# Patient Record
Sex: FEMALE | Race: WHITE | URBAN - METROPOLITAN AREA
[De-identification: names, ages, dates, MRNs, and addresses within clinical notes are randomized per-mention and may not be internally consistent; named-entity substitution may affect disease eponyms.]

---

## 2017-02-10 ENCOUNTER — OFFICE VISIT (OUTPATIENT)
Dept: OBGYN | Facility: CLINIC | Age: 22
End: 2017-02-10
Attending: OBSTETRICS & GYNECOLOGY
Payer: COMMERCIAL

## 2017-02-10 VITALS
DIASTOLIC BLOOD PRESSURE: 67 MMHG | HEIGHT: 67 IN | WEIGHT: 138.4 LBS | BODY MASS INDEX: 21.72 KG/M2 | SYSTOLIC BLOOD PRESSURE: 122 MMHG | HEART RATE: 75 BPM

## 2017-02-10 DIAGNOSIS — Z00.00 ROUTINE GENERAL MEDICAL EXAMINATION AT A HEALTH CARE FACILITY: Primary | ICD-10-CM

## 2017-02-10 DIAGNOSIS — Z30.019 ENCOUNTER FOR INITIAL PRESCRIPTION OF CONTRACEPTIVES: ICD-10-CM

## 2017-02-10 PROBLEM — F41.9 ANXIETY: Status: ACTIVE | Noted: 2017-02-10

## 2017-02-10 PROCEDURE — 99212 OFFICE O/P EST SF 10 MIN: CPT | Mod: 25,ZF

## 2017-02-10 RX ORDER — NORGESTIMATE AND ETHINYL ESTRADIOL 7DAYSX3 LO
1 KIT ORAL DAILY
Qty: 28 TABLET | Refills: 3 | Status: SHIPPED
Start: 2017-02-10 | End: 2017-05-26

## 2017-02-10 ASSESSMENT — ANXIETY QUESTIONNAIRES
5. BEING SO RESTLESS THAT IT IS HARD TO SIT STILL: NOT AT ALL
GAD7 TOTAL SCORE: 2
6. BECOMING EASILY ANNOYED OR IRRITABLE: NOT AT ALL
1. FEELING NERVOUS, ANXIOUS, OR ON EDGE: SEVERAL DAYS
7. FEELING AFRAID AS IF SOMETHING AWFUL MIGHT HAPPEN: NOT AT ALL
2. NOT BEING ABLE TO STOP OR CONTROL WORRYING: NOT AT ALL
3. WORRYING TOO MUCH ABOUT DIFFERENT THINGS: NOT AT ALL

## 2017-02-10 ASSESSMENT — PATIENT HEALTH QUESTIONNAIRE - PHQ9: 5. POOR APPETITE OR OVEREATING: SEVERAL DAYS

## 2017-02-10 NOTE — LETTER
2/10/2017       RE: Nurys Gerardo  [address] [facility] [place] [address]     Dear Colleague,    Thank you for referring your patient, Nurys Gerardo, to the [facility] at [facility]. Please see a copy of my visit note below.    See, could you finish your incomplete note on this patient so i may close the encounter?    [facility]  Annual Exam    HPI:    Nurys Gerardo is a 22 year old , here for well woman exam and to discuss contraception.  She tried OCPs, unsure of what kind, last summer but reports she stopped after a couple of months after they changed her mood.  She otherwise denies any concerns. Of note, the patient states she received Gardasil vaccines x3.     GYN History  - LMP: Patient's last menstrual period was 2017 (exact date).  - Menses: Menarche at 13, cycles q28-30, lasting 6-7 days, with mild to moderate flow, moderate cramps  - Pap Smears: Never before  - Contraception: OCPs in past for a short time   - Sexual Activity: Fore play but denies oral or penetrative intercourse    - Hx STIs: Denies  - Hx UTIs: Denies     OBHx  Obstetric History       T0      TAB0   SAB0   E0   M0   L0           Past Medical History   Diagnosis Date     Anxiety      Sees therapist       Past Surgical History   Procedure Laterality Date     Dental surgery       Molar removal      Current Outpatient Prescriptions   Medication     norgestim-eth estrad triphasic (ORTHO TRI-CYCLEN LO) 0.18/0.215/0.25 MG-25 MCG per tablet     multivitamin, therapeutic with minerals (THERA-VIT-M) TABS     NAPROXEN PO     DiphenhydrAMINE HCl (BENADRYL PO)     No current facility-administered medications for this visit.         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, graduating in Spring 2018  - Has had a boyfriend for 1 year  - In the marching band;   "s[employer]    Family History   Problem Relation Age of Onset     Hypertension Father      Asthma Paternal Grandmother      Bladder Cancer Maternal Grandfather       ROS: 10-Point ROS negative except as noted in HPI    Preventative Health  Feelings of depression: Denies   Seat belt usage: Yes, daily basis  Diet: Meats, veggies, fruits   Exercise: Yes, regularly (run, play racquet ball)    Physical Exam  /67 mmHg  Pulse 75  Ht 1.702 m (5' 7\")  Wt 62.778 kg (138 lb 6.4 oz)  BMI 21.67 kg/m2  LMP 02/04/2017 (Exact Date)  Breastfeeding? No  Gen: Well-appearing, NAD  HEENT: Normocephalic, atraumatic  Neck: Thyroid is not enlarged, no appreciable masses palpated. Non-tender  CV:  RRR, no m/r/g auscultated  Pulm: CTAB, no w/r/r auscultated  Abd: Soft, non-tender, non-distended  Ext: No LE edema, extremities warm and well perfused    Breast: Symmetric, no nipple discharge or retraction, no axillary lymphadenopathy, no palpable nodules or masses bilaterally.    Pelvic:  Normal appearing external female genitalia. Normal hair distribution. After three attempts with medium and small Soliman speculums, the patient was unable to tolerate speculum exam to visualize cervix or obtain pap smear.  Uterus is small, mobile, non-tender. No adnexal tenderness or masses.     --Ideal BMI: 18.5-24.9  Current BMI: Body mass index is 21.67 kg/(m^2).  --Underweight = <18.5  --Normal weight = 18.5-24.9  --Overweight = 25-29.9  --Obesity = >30    Assessment/Plan  Nurys Gerardo is a 22 year old G0 female here for annual exam and contraception counseling.     1. Routine Health Maintenance:   - Unable to perform pap smear.  Recommend patient come back in 6 weeks for pap smear (using lidocaine gel prior)    2. Contraception counseling   - Patient elected for new OCP Rx today   - Patient information given   - Will review option at follow up (pap smear) appointment    Sulema Lzooya MD   OB/Gyn Resident, PGY-3  February 10, 2017, 3:24 PM "     Patient was seen by the resident in Continuity of Care Clinic.  I reviewed the history & exam.  The patient's assessment and plan were made jointly.    Chrissy Lane MD MPH  Sulema Galeana MD

## 2017-02-10 NOTE — PROGRESS NOTES
[facility]  Annual Exam    HPI:    Nurys Gerardo is a 22 year old , here for well woman exam and to discuss contraception.  She tried OCPs, unsure of what kind, last summer but reports she stopped after a couple of months after they changed her mood.  She otherwise denies any concerns. Of note, the patient states she received Gardasil vaccines x3.     GYN History  - LMP: Patient's last menstrual period was 2017 (exact date).  - Menses: Menarche at 13, cycles q28-30, lasting 6-7 days, with mild to moderate flow, moderate cramps  - Pap Smears: Never before  - Contraception: OCPs in past for a short time   - Sexual Activity: Fore play but denies oral or penetrative intercourse    - Hx STIs: Denies  - Hx UTIs: Denies     OBHx  Obstetric History       T0      TAB0   SAB0   E0   M0   L0           Past Medical History   Diagnosis Date     Anxiety      Sees therapist       Past Surgical History   Procedure Laterality Date     Dental surgery       Molar removal      Current Outpatient Prescriptions   Medication     norgestim-eth estrad triphasic (ORTHO TRI-CYCLEN LO) 0.18/0.215/0.25 MG-25 MCG per tablet     multivitamin, therapeutic with minerals (THERA-VIT-M) TABS     NAPROXEN PO     DiphenhydrAMINE HCl (BENADRYL PO)     No current facility-administered medications for this visit.         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[employer], graduating in Spring 2018  - Has had a boyfriend for 1 year  - In the marching band; tenor till[employer]    Family History   Problem Relation Age of Onset     Hypertension Father      Asthma Paternal Grandmother      Bladder Cancer Maternal Grandfather       ROS: 10-Point ROS negative except as noted in HPI    Preventative Health  Feelings of depression: Denies   Seat belt usage: Yes, daily basis  Diet: Meats, veggies, fruits   Exercise: Yes, regularly (run, play racquet  "ball)    Physical Exam  /67 mmHg  Pulse 75  Ht 1.702 m (5' 7\")  Wt 62.778 kg (138 lb 6.4 oz)  BMI 21.67 kg/m2  LMP 02/04/2017 (Exact Date)  Breastfeeding? No  Gen: Well-appearing, NAD  HEENT: Normocephalic, atraumatic  Neck: Thyroid is not enlarged, no appreciable masses palpated. Non-tender  CV:  RRR, no m/r/g auscultated  Pulm: CTAB, no w/r/r auscultated  Abd: Soft, non-tender, non-distended  Ext: No LE edema, extremities warm and well perfused    Breast: Symmetric, no nipple discharge or retraction, no axillary lymphadenopathy, no palpable nodules or masses bilaterally.    Pelvic:  Normal appearing external female genitalia. Normal hair distribution. After three attempts with medium and small Soliman speculums, the patient was unable to tolerate speculum exam to visualize cervix or obtain pap smear.  Uterus is small, mobile, non-tender. No adnexal tenderness or masses.       --Ideal BMI: 18.5-24.9  Current BMI: Body mass index is 21.67 kg/(m^2).  --Underweight = <18.5  --Normal weight = 18.5-24.9  --Overweight = 25-29.9  --Obesity = >30    Assessment/Plan  Nurys Gerardo is a 22 year old G0 female here for annual exam and contraception counseling.     1. Routine Health Maintenance:   - Unable to perform pap smear.  Recommend patient come back in 6 weeks for pap smear (using lidocaine gel prior)    2. Contraception counseling   - Patient elected for new OCP Rx today   - Patient information given   - Will review option at follow up (pap smear) appointment    Sulema Lozoya MD   OB/Gyn Resident, PGY-3  February 10, 2017, 3:24 PM     Patient was seen by the resident in Continuity of Care Clinic.  I reviewed the history & exam.  The patient's assessment and plan were made jointly.    Chrissy Lane MD MPH    "

## 2017-02-10 NOTE — MR AVS SNAPSHOT
After Visit Summary   2/10/2017    Nurys Gerardo    MRN: [id number]           Patient Information     Date Of Birth          1995        Visit Information        Provider Department      2/10/2017 3:00 PM Sulema Galeana MD [facility]        Today's Diagnoses     Routine general medical examination at a health care facility    -  1     Encounter for initial prescription of contraceptives           Care Instructions    Make a pap smear appointment in 6 weeks         Follow-ups after your visit        Follow-up notes from your care team     Return in about 6 weeks (around 3/24/2017).      Who to contact     Please call your clinic at [phone] to:    Ask questions about your health    Make or cancel appointments    Discuss your medicines    Learn about your test results    Speak to your doctor   If you have compliments or concerns about an experience at your clinic, or if you wish to file a complaint, please contact [facility] Patient Relations at [phone] or email us at [email or web address]         Additional Information About Your Visit        MyChart Information     Kwanji is an electronic gateway that provides easy, online access to your medical records. With Kwanji, you can request a clinic appointment, read your test results, renew a prescription or communicate with your care team.     To sign up for Kwanji visit the website at [email or web address]   You will be asked to enter the access code listed below, as well as some personal information. Please follow the directions to create your username and password.     Your access code is: [email or web address]  Expires: 2017  9:00 AM     Your access code will  in 90 days. If you need help or a new code, please contact your [facility] or call [phone] for assistance.        Care EveryWhere ID     This is your Care EveryWhere ID. This  "could be used by other organizations to access your [facility] medical records  [id number]        Your Vitals Were     Pulse Height BMI (Body Mass Index) Last Period Breastfeeding?       75 1.702 m (5' 7\") 21.67 kg/m2 02/04/2017 (Exact Date) No        Blood Pressure from Last 3 Encounters:   02/10/17 122/67   05/01/16 121/72    Weight from Last 3 Encounters:   02/10/17 62.778 kg (138 lb 6.4 oz)   05/01/16 63.504 kg (140 lb)              Today, you had the following     No orders found for display         Today's Medication Changes          These changes are accurate as of: 2/10/17  5:09 PM.  If you have any questions, ask your nurse or doctor.               Start taking these medicines.        Dose/Directions    norgestim-eth estrad triphasic 0.18/0.215/0.25 MG-25 MCG per tablet   Commonly known as:  ORTHO TRI-CYCLEN LO   Used for:  Encounter for initial prescription of contraceptives   Started by:  Sulema Galeana MD        Dose:  1 tablet   Take 1 tablet by mouth daily   Quantity:  28 tablet   Refills:  3            Where to get your medicines      These medications were sent to [facility]/pharmacy #[id number] - [place], [place] - [address]  [address] [address]     Phone:  [phone]    - norgestim-eth estrad triphasic 0.18/0.215/0.25 MG-25 MCG per tablet             Primary Care Provider Office Phone # Fax #    Jennifer FAZAL Villalba [phone]       [facility] [address] [address]        Thank you!     Thank you for choosing [facility]  for your care. Our goal is always to provide you with excellent care. Hearing back from our patients is one way we can continue to improve our services. Please take a few minutes to complete the written survey that you may receive in the mail after your visit with us. Thank you!             Your Updated Medication List - Protect others around you: Learn how to safely use, store and throw away " your medicines at www.disposemymeds.org.          This list is accurate as of: 2/10/17  5:09 PM.  Always use your most recent med list.                   Brand Name Dispense Instructions for use    BENADRYL PO          multivitamin, therapeutic with minerals Tabs tablet      Take 1 tablet by mouth daily       NAPROXEN PO      Take 500 mg by mouth       norgestim-eth estrad triphasic 0.18/0.215/0.25 MG-25 MCG per tablet    ORTHO TRI-CYCLEN LO    28 tablet    Take 1 tablet by mouth daily

## 2017-02-11 ASSESSMENT — PATIENT HEALTH QUESTIONNAIRE - PHQ9: SUM OF ALL RESPONSES TO PHQ QUESTIONS 1-9: 2

## 2017-02-11 ASSESSMENT — ANXIETY QUESTIONNAIRES: GAD7 TOTAL SCORE: 2

## 2017-05-26 ENCOUNTER — OFFICE VISIT (OUTPATIENT)
Dept: DERMATOLOGY | Facility: CLINIC | Age: 22
End: 2017-05-26

## 2017-05-26 DIAGNOSIS — D22.9 MULTIPLE NEVI: ICD-10-CM

## 2017-05-26 DIAGNOSIS — Z12.83 SKIN CANCER SCREENING: ICD-10-CM

## 2017-05-26 DIAGNOSIS — L70.0 ACNE VULGARIS: Primary | ICD-10-CM

## 2017-05-26 ASSESSMENT — PAIN SCALES - GENERAL: PAINLEVEL: NO PAIN (0)

## 2017-05-26 NOTE — NURSING NOTE
Dermatology Rooming Note    Nurys Gerardo's goals for this visit include:   Chief Complaint   Patient presents with     Derm Problem     Nurys is here today for a skin check. Has a concerning mole on her left breast that is getting bigger.       Catarina Acevedo LPN

## 2017-05-26 NOTE — LETTER
5/26/2017       RE: Nurys Gerardo  [address] [address]     Dear Colleague,    Thank you for referring your patient, Nurys Gerardo, to the [facility] at [facility]. Please see a copy of my visit note below.    [facility] Dermatology Note      Dermatology Problem List:  1.Acne vulgaris  -current treatment Neutrogena acne wash, followed by moisturizer  2. Multiple clinically benign nevi on the face, arms, chest, legs  -to follow back in 2-3 years or earlier for changing lesions    Encounter Date: May 26, 2017    CC:  Chief Complaint   Patient presents with     Derm Problem     Nurys is here today for a skin check. Has a concerning mole on her left breast that is getting bigger.         History of Present Illness:  Ms. Nurys Gerardo is a 22 year old female who presents in self referral for changing mole on left breast. She first noticed the mole appear about 1 year ago and it has changed is size, color without itching, bleeding. She had frequent sunburns as a child with peeling. She currently only uses SPF 50 when she knows she will be outside for extended periods of time. She has no family history of skin cancer or melanoma. She has a personal history of acne but no other skin conditions.    Past Medical History:   Patient Active Problem List   Diagnosis     Anxiety       Past Surgical History:   Procedure Laterality Date     DENTAL SURGERY      Molar removal       Social History:  The patient is a student. The patient admits to use of tanning beds.    Family History:  There is no family history of skin cancer. and There is no family history of melanoma or pancreatic cancer.    Medications:  Current Outpatient Prescriptions   Medication Sig Dispense Refill     cholecalciferol (VITAMIN D3) 1000 UNIT tablet Take 1,000 Units by mouth       Calcium Carb-Cholecalciferol 600-1000 MG-UNIT CAPS        multivitamin, therapeutic with  minerals (THERA-VIT-M) TABS Take 1 tablet by mouth daily       NAPROXEN PO Take 500 mg by mouth as needed        DiphenhydrAMINE HCl (BENADRYL PO) Take by mouth as needed        Allergies   Allergen Reactions     Amoxicillin Hives     Zithromax [Azithromycin] Hives       Review of Systems:  -Skin/Heme New Pt: The patient denies frequent sun exposure. The patient denies excessive scarring or problems healing except as per HPI. The patient denies excessive bleeding.  -Allergy/Immunology: No history of nickel or other skin allergy. No history of asthma or hay fever.  -GI: The patient denies nausea, vomiting, diarrhea or abdominal pain., Rheum: The patient denies arthralgia, joint stiffness, joint swelling or myalgias.  -Constitutional: The patient denies fatigue, fevers, chills, unintended weight loss, and night sweats.  -HEENT: Patient denies nonhealing oral sores.  -Skin: As above in HPI. No additional skin concerns.    Physical exam:  Vitals: There were no vitals taken for this visit.  GEN: This is a well developed, well-nourished female in no acute distress, in a pleasant mood.    SKIN: Total skin excluding the undergarment areas was performed. The exam included the head/face, neck, both arms, chest, back, abdomen, both legs, digits and/or nails.   -several tan-to-pigmented macules and papules over the face, arms, chest, legs consistent with nevi  -right outer axilla with cafe au lait patch  -Face with one 3-4 mm flesh toned soft domed shaped papule with protruding hair consistent with nevi  -She has one discrete dound < 2 mm dark brown macule on the left breast with globular nature under dermatoscope.   -one discrete square-shaped 3 x 2 mm dark brown macule on the back of the right lower leg consistent with nevi  -No other lesions of concern on areas examined.     Impression/Plan:  1. Multiple clinically benign nevi on the face, arms, chest, legs    ABCDs of melanoma were discussed and self skin checks were  advised.     Benign nature was discussed. No further intervention needed at this time.     discussed how she is at an age where her body will continue to produce new moles, and in the 1st few months will grow to their intended size and then stop. Discussed if she were to see moles doubling in size over several months, to call and return to clinic.     2. Acne vulgaris    Patient is happy with her current regimen.     Continue Neutrogena facial wash.       Follow-up in 2-3 years, earlier for new or changing lesions.     Staff Involved:  Scribed by Kate Bacon, MS4 for Dr. Anne.      Ms. Bacon acted as a scribe for me today and accurately reflected my words and actions.    I agree with above History, Review of Systems, Physical exam and Plan.  I have reviewed the content of the documentation and have edited it as needed. I have personally performed the services documented here and the documentation accurately represents those services and the decisions I have made.     Shey Anne MD  Dermatology Staff

## 2017-05-26 NOTE — PATIENT INSTRUCTIONS
Making New moles at your age is completely normal. We would expect for your body to make new moles up until your late 30's.   Like we discussed, you may take photo of your current moles and can review them from time to time to look for any changes. If there are ever any changing moles you are concerned about please call and come in for us to take a look.     Follow up with us in 2-3 years.     Sunscreens topical dosage forms  What are Sunscreens topical dosage forms?  SUNSCREENS protect your skin from the harmful effects of the sun and help to prevent sunburn. There are 2 different kinds of sunscreens called 'physical sunscreens' and 'chemical sunscreens.' Physical sunscreens reflect the sun's UV radiation. Chemical sunscreens absorb the sun's UV radiation. All physical sunscreens give UVA and UVB protection. All chemical sunscreens give UVB protection. Some chemical sunscreens give both UVA and UVB protection. Limiting the amount of time you spend in the sun and using sunscreens can help prevent wrinkles and skin damage, such as skin cancer.  What should my health care professional know before I receive Sunscreens?  They need to know if you have any of these conditions:    an unusual reaction to sunscreens, PABA, other medicines, foods, dyes, or preservatives    pregnant or trying to get pregnant    breast-feeding  How should this medicine be used?  The sun protection factor (SPF) found on the product label tells you how much protection a sunscreen offers. Products with high SPFs give more protection against the sun than products with low SPF. Choose a sunscreen product based on the type of activity in which you are involved, your age, site of application, your skin condition, and your skin type. Ask your pharmacist or health care professional about which sunscreen product is best for you.  Sunscreens are for external use only; apply only to the skin. Do not take by mouth. Apply evenly and liberally to all  exposed areas of the skin 30 minutes before any sun exposure. Reapply sunscreens every 1--2 hours and after swimming, excessive sweating, or towel drying. Follow the directions on the product label.  Sunscreens are not recommended for infants less than 6 months of age. Infants in this age group should be kept out of the sun. Children and infants who are 6 months of age and older should use sunscreens that contain an SPF of 15 or higher. Contact your pediatrician or health care professional regarding the use of this medicine in children.  Use topical insect repellants containing diethyltoluamide, DEET cautiously while using sunscreens. Sunscreens may increase the absorption of diethyltoluamide, DEET into the skin. This is especially important in children.  What if I miss a dose?  Apply it as soon as you remember.  What drug(s) may interact with Sunscreens?    Estrasorb  topical estrogen emulsion    insect repellants containing diethyltoluamide, DEET  Tell your prescriber or health care professional about all other medicines you are taking, including non-prescription medicines, nutritional supplements, or herbal products. Check with your health care professional before stopping or starting any of your medicines.  What should I watch for while taking Sunscreens?  Do not get sunscreen in your eyes. If you do, rinse out with plenty of cool water.  Minimize your exposure to the sun between the hours of 10 a.m. and 2 p.m. (11 a.m. and 3 p.m. daylight savings time). Be extra careful on cloudy or overcast days and around reflective surfaces such as concrete, sand, snow, or water. You should also wear protective clothing including a hat, long-sleeved shirt, and sunglasses. Avoid sunlamps and tanning beds.  Some sunscreens may discolor and stain light-colored fabrics yellow. Allow sunscreen to dry before covering the area to which the sunscreen was applied.  What side effects may I notice from receiving Sunscreens?  Side  effects that you should report to your prescriber or health care professional as soon as possible:    dark red spots on the skin    painful, red, pus-filled blisters in hair follicles  Side effects that usually do not require medical attention (report to your prescriber or health care professional if they continue or are bothersome):    acne    burning or itching of the skin    dry or irritated skin  If you experience skin irritation from a sunscreen you can try a different formulation to prevent the reaction from recurring.  Where can I keep my medicine?  Keep out of reach of children.  Store below 40 degrees C (104 degrees F), preferably at room temperature between 15--30 degrees C (59 and 86 degrees F), unless otherwise specified by the . Store away from heat and direct light. Replace sunscreens yearly to maintain their effectiveness. Discard after expiration date on the bottle.  NOTE:This sheet is a summary. It may not cover all possible information. If you have questions about this medicine, talk to your doctor, pharmacist, or health care provider. Copyright  2016 Gold Standard      Recognizing Skin Cancer  Doing monthly skin checkups is the best way to find new marks or skin changes. During your skin checkups, be sure to follow the ABCDEs of skin checks. This means checking moles or other growths for Asymmetry, Border, Color, Diameter, and Evolving (changing). Note, too, any new growths, or, if any of your growths bleed, itch, or are painful.  The ABCDEs of skin checks  Check your moles or growths for signs of melanoma using ABCDE:    Asymmetry: the sides of the mole or growth don t match    Border: the edges are ragged, notched, or blurred    Color: the color within the mole or growth varies    Diameter: the mole or growth is larger than 6 mm (size of a pencil eraser)    Evolving: the size, shape, or color of the mole or growth is changing (evolving is not shown below.)       In addition to the  ABCDEs, other warning signs of skin cancer include:    A spot or mole that looks different from all other marks on your skin    Changes in how an area feels, such as itching, tenderness, or pain    Changes in the skin's surface, such as oozing, bleeding, or scaliness    A sore that does not heal    New swelling or redness beyond the border of a mole  Who s at risk?  Anyone can get skin cancer. But you are at greater risk if you have:    Fair skin, light-colored hair, or light-colored eyes    Many moles or abnormal moles on your skin    A history of sunburns from sunlight or tanning beds    A family history of skin cancer    A history of exposure to radiation or chemicals    A weakened immune system  Also, a personal history of skin cancer puts you at risk for recurring skin cancer.  How to check your skin  Do your monthly skin checkups in front of a full-length mirror. Check all parts of your body, including your:    Head (ears, face, neck, and scalp)    Torso (front, back, and sides)    Arms (tops, undersides, upper, and lower armpits)    Hands (palms, backs, and fingers, including under the nails)    Buttocks and genitals    Legs (front, back, and sides)    Feet (tops, soles, toes, including under the nails, and between toes)  If you have a lot of moles, take digital photos of them each month. Make sure to take photos both up close and from a distance. These can help you see if any moles change over time.  When to seek medical treatment  Most skin changes are not cancer. But if you see any changes in your skin, call your doctor right away. Only he or she can diagnose a problem. If you have skin cancer, seeing your doctor can be the first step toward getting the treatment that could save your life.     4793-1799 [employer] [address] [place] [address], [place] [address]. All rights reserved. This information is not intended as a substitute for professional medical care. Always follow your healthcare  professional's instructions.

## 2017-05-26 NOTE — PROGRESS NOTES
[facility] Dermatology Note      Dermatology Problem List:  1.Acne vulgaris  -current treatment Neutrogena acne wash, followed by moisturizer  2. Multiple clinically benign nevi on the face, arms, chest, legs  -to follow back in 2-3 years or earlier for changing lesions    Encounter Date: May 26, 2017    CC:  Chief Complaint   Patient presents with     Derm Problem     Nurys is here today for a skin check. Has a concerning mole on her left breast that is getting bigger.         History of Present Illness:  Ms. Nurys Gerardo is a 22 year old female who presents in self referral for changing mole on left breast. She first noticed the mole appear about 1 year ago and it has changed is size, color without itching, bleeding. She had frequent sunburns as a child with peeling. She currently only uses SPF 50 when she knows she will be outside for extended periods of time. She has no family history of skin cancer or melanoma. She has a personal history of acne but no other skin conditions.    Past Medical History:   Patient Active Problem List   Diagnosis     Anxiety       Past Surgical History:   Procedure Laterality Date     DENTAL SURGERY      Molar removal       Social History:  The patient is a student. The patient admits to use of tanning beds.    Family History:  There is no family history of skin cancer. and There is no family history of melanoma or pancreatic cancer.    Medications:  Current Outpatient Prescriptions   Medication Sig Dispense Refill     cholecalciferol (VITAMIN D3) 1000 UNIT tablet Take 1,000 Units by mouth       Calcium Carb-Cholecalciferol 600-1000 MG-UNIT CAPS        multivitamin, therapeutic with minerals (THERA-VIT-M) TABS Take 1 tablet by mouth daily       NAPROXEN PO Take 500 mg by mouth as needed        DiphenhydrAMINE HCl (BENADRYL PO) Take by mouth as needed        Allergies   Allergen Reactions     Amoxicillin Hives     Zithromax [Azithromycin] Hives       Review of  Systems:  -Skin/Heme New Pt: The patient denies frequent sun exposure. The patient denies excessive scarring or problems healing except as per HPI. The patient denies excessive bleeding.  -Allergy/Immunology: No history of nickel or other skin allergy. No history of asthma or hay fever.  -GI: The patient denies nausea, vomiting, diarrhea or abdominal pain., Rheum: The patient denies arthralgia, joint stiffness, joint swelling or myalgias.  -Constitutional: The patient denies fatigue, fevers, chills, unintended weight loss, and night sweats.  -HEENT: Patient denies nonhealing oral sores.  -Skin: As above in HPI. No additional skin concerns.    Physical exam:  Vitals: There were no vitals taken for this visit.  GEN: This is a well developed, well-nourished female in no acute distress, in a pleasant mood.    SKIN: Total skin excluding the undergarment areas was performed. The exam included the head/face, neck, both arms, chest, back, abdomen, both legs, digits and/or nails.   -several tan-to-pigmented macules and papules over the face, arms, chest, legs consistent with nevi  -right outer axilla with cafe au lait patch  -Face with one 3-4 mm flesh toned soft domed shaped papule with protruding hair consistent with nevi  -She has one discrete dound < 2 mm dark brown macule on the left breast with globular nature under dermatoscope.   -one discrete square-shaped 3 x 2 mm dark brown macule on the back of the right lower leg consistent with nevi  -No other lesions of concern on areas examined.     Impression/Plan:  1. Multiple clinically benign nevi on the face, arms, chest, legs    ABCDs of melanoma were discussed and self skin checks were advised.     Benign nature was discussed. No further intervention needed at this time.     discussed how she is at an age where her body will continue to produce new moles, and in the 1st few months will grow to their intended size and then stop. Discussed if she were to see moles  doubling in size over several months, to call and return to clinic.     2. Acne vulgaris    Patient is happy with her current regimen.     Continue Neutrogena facial wash.       Follow-up in 2-3 years, earlier for new or changing lesions.     Staff Involved:  Scribed by Kate Bacon, MS4 for Dr. Anne.      Ms. Arleen acted as a scribe for me today and accurately reflected my words and actions.    I agree with above History, Review of Systems, Physical exam and Plan.  I have reviewed the content of the documentation and have edited it as needed. I have personally performed the services documented here and the documentation accurately represents those services and the decisions I have made.     Shey Anne MD  Dermatology Staff

## 2017-05-26 NOTE — MR AVS SNAPSHOT
After Visit Summary   5/26/2017    Nurys Gerardo    MRN: [id number]           Patient Information     Date Of Birth          1995        Visit Information        Provider Department      5/26/2017 10:15 AM Shey Anne MD [facility] Dermatology        Care Instructions      Making New moles at your age is completely normal. We would expect for your body to make new moles up until your late 30's.   Like we discussed, you may take photo of your current moles and can review them from time to time to look for any changes. If there are ever any changing moles you are concerned about please call and come in for us to take a look.     Follow up with us in 2-3 years.     Sunscreens topical dosage forms  What are Sunscreens topical dosage forms?  SUNSCREENS protect your skin from the harmful effects of the sun and help to prevent sunburn. There are 2 different kinds of sunscreens called 'physical sunscreens' and 'chemical sunscreens.' Physical sunscreens reflect the sun's UV radiation. Chemical sunscreens absorb the sun's UV radiation. All physical sunscreens give UVA and UVB protection. All chemical sunscreens give UVB protection. Some chemical sunscreens give both UVA and UVB protection. Limiting the amount of time you spend in the sun and using sunscreens can help prevent wrinkles and skin damage, such as skin cancer.  What should my health care professional know before I receive Sunscreens?  They need to know if you have any of these conditions:    an unusual reaction to sunscreens, PABA, other medicines, foods, dyes, or preservatives    pregnant or trying to get pregnant    breast-feeding  How should this medicine be used?  The sun protection factor (SPF) found on the product label tells you how much protection a sunscreen offers. Products with high SPFs give more protection against the sun than products with low SPF. Choose a sunscreen product based on the type of activity in which you are  involved, your age, site of application, your skin condition, and your skin type. Ask your pharmacist or health care professional about which sunscreen product is best for you.  Sunscreens are for external use only; apply only to the skin. Do not take by mouth. Apply evenly and liberally to all exposed areas of the skin 30 minutes before any sun exposure. Reapply sunscreens every 1--2 hours and after swimming, excessive sweating, or towel drying. Follow the directions on the product label.  Sunscreens are not recommended for infants less than 6 months of age. Infants in this age group should be kept out of the sun. Children and infants who are 6 months of age and older should use sunscreens that contain an SPF of 15 or higher. Contact your pediatrician or health care professional regarding the use of this medicine in children.  Use topical insect repellants containing diethyltoluamide, DEET cautiously while using sunscreens. Sunscreens may increase the absorption of diethyltoluamide, DEET into the skin. This is especially important in children.  What if I miss a dose?  Apply it as soon as you remember.  What drug(s) may interact with Sunscreens?    Estrasorb  topical estrogen emulsion    insect repellants containing diethyltoluamide, DEET  Tell your prescriber or health care professional about all other medicines you are taking, including non-prescription medicines, nutritional supplements, or herbal products. Check with your health care professional before stopping or starting any of your medicines.  What should I watch for while taking Sunscreens?  Do not get sunscreen in your eyes. If you do, rinse out with plenty of cool water.  Minimize your exposure to the sun between the hours of 10 a.m. and 2 p.m. (11 a.m. and 3 p.m. daylight savings time). Be extra careful on cloudy or overcast days and around reflective surfaces such as concrete, sand, snow, or water. You should also wear protective clothing including a  hat, long-sleeved shirt, and sunglasses. Avoid sunlamps and tanning beds.  Some sunscreens may discolor and stain light-colored fabrics yellow. Allow sunscreen to dry before covering the area to which the sunscreen was applied.  What side effects may I notice from receiving Sunscreens?  Side effects that you should report to your prescriber or health care professional as soon as possible:    dark red spots on the skin    painful, red, pus-filled blisters in hair follicles  Side effects that usually do not require medical attention (report to your prescriber or health care professional if they continue or are bothersome):    acne    burning or itching of the skin    dry or irritated skin  If you experience skin irritation from a sunscreen you can try a different formulation to prevent the reaction from recurring.  Where can I keep my medicine?  Keep out of reach of children.  Store below 40 degrees C (104 degrees F), preferably at room temperature between 15--30 degrees C (59 and 86 degrees F), unless otherwise specified by the . Store away from heat and direct light. Replace sunscreens yearly to maintain their effectiveness. Discard after expiration date on the bottle.  NOTE:This sheet is a summary. It may not cover all possible information. If you have questions about this medicine, talk to your doctor, pharmacist, or health care provider. Copyright  2016 Gold Standard      Recognizing Skin Cancer  Doing monthly skin checkups is the best way to find new marks or skin changes. During your skin checkups, be sure to follow the ABCDEs of skin checks. This means checking moles or other growths for Asymmetry, Border, Color, Diameter, and Evolving (changing). Note, too, any new growths, or, if any of your growths bleed, itch, or are painful.  The ABCDEs of skin checks  Check your moles or growths for signs of melanoma using ABCDE:    Asymmetry: the sides of the mole or growth don t match    Border: the edges  are ragged, notched, or blurred    Color: the color within the mole or growth varies    Diameter: the mole or growth is larger than 6 mm (size of a pencil eraser)    Evolving: the size, shape, or color of the mole or growth is changing (evolving is not shown below.)       In addition to the ABCDEs, other warning signs of skin cancer include:    A spot or mole that looks different from all other marks on your skin    Changes in how an area feels, such as itching, tenderness, or pain    Changes in the skin's surface, such as oozing, bleeding, or scaliness    A sore that does not heal    New swelling or redness beyond the border of a mole  Who s at risk?  Anyone can get skin cancer. But you are at greater risk if you have:    Fair skin, light-colored hair, or light-colored eyes    Many moles or abnormal moles on your skin    A history of sunburns from sunlight or tanning beds    A family history of skin cancer    A history of exposure to radiation or chemicals    A weakened immune system  Also, a personal history of skin cancer puts you at risk for recurring skin cancer.  How to check your skin  Do your monthly skin checkups in front of a full-length mirror. Check all parts of your body, including your:    Head (ears, face, neck, and scalp)    Torso (front, back, and sides)    Arms (tops, undersides, upper, and lower armpits)    Hands (palms, backs, and fingers, including under the nails)    Buttocks and genitals    Legs (front, back, and sides)    Feet (tops, soles, toes, including under the nails, and between toes)  If you have a lot of moles, take digital photos of them each month. Make sure to take photos both up close and from a distance. These can help you see if any moles change over time.  When to seek medical treatment  Most skin changes are not cancer. But if you see any changes in your skin, call your doctor right away. Only he or she can diagnose a problem. If you have skin cancer, seeing your doctor can be  the first step toward getting the treatment that could save your life.     9231-0791 [employer]. [address] [place] [address], [place] [address]. All rights reserved. This information is not intended as a substitute for professional medical care. Always follow your healthcare professional's instructions.                Follow-ups after your visit        Who to contact     Please call your clinic at [phone] to:    Ask questions about your health    Make or cancel appointments    Discuss your medicines    Learn about your test results    Speak to your doctor   If you have compliments or concerns about an experience at your clinic, or if you wish to file a complaint, please contact [facility] Patient Relations at [phone] or email us at [email or web address]         Additional Information About Your Visit        Care EveryWhere ID     This is your Care EveryWhere ID. This could be used by other organizations to access your [facility] medical records  [id number]         Blood Pressure from Last 3 Encounters:   02/10/17 122/67   05/01/16 121/72    Weight from Last 3 Encounters:   02/10/17 62.8 kg (138 lb 6.4 oz)   05/01/16 63.5 kg (140 lb)              Today, you had the following     No orders found for display         Today's Medication Changes          These changes are accurate as of: 5/26/17 10:53 AM.  If you have any questions, ask your nurse or doctor.               Stop taking these medicines if you haven't already. Please contact your care team if you have questions.     norgestim-eth estrad triphasic 0.18/0.215/0.25 MG-25 MCG per tablet   Commonly known as:  ORTHO TRI-CYCLEN LO   Stopped by:  Shey Anne MD                    Primary Care Provider Office Phone # Fax #    Jennifer Villalba [phone]       [facility] [address] [address]        Thank you!     Thank you for choosing [facility]   for your care. Our goal is always to provide you with excellent care. Hearing back from our patients is one way we can continue to improve our services. Please take a few minutes to complete the written survey that you may receive in the mail after your visit with us. Thank you!             Your Updated Medication List - Protect others around you: Learn how to safely use, store and throw away your medicines at www.disposemymeds.org.          This list is accurate as of: 5/26/17 10:53 AM.  Always use your most recent med list.                   Brand Name Dispense Instructions for use    BENADRYL PO      Take by mouth as needed       Calcium Carb-Cholecalciferol 600-1000 MG-UNIT Caps          cholecalciferol 1000 UNIT tablet    vitamin D     Take 1,000 Units by mouth       multivitamin, therapeutic with minerals Tabs tablet      Take 1 tablet by mouth daily       NAPROXEN PO      Take 500 mg by mouth as needed

## 2017-05-29 PROBLEM — D22.9 MULTIPLE NEVI: Status: ACTIVE | Noted: 2017-05-29

## 2017-05-29 PROBLEM — L70.0 ACNE VULGARIS: Status: ACTIVE | Noted: 2017-05-29

## 2017-05-29 PROBLEM — Z12.83 SKIN CANCER SCREENING: Status: ACTIVE | Noted: 2017-05-29

## 2018-03-25 ENCOUNTER — HEALTH MAINTENANCE LETTER (OUTPATIENT)
Age: 23
End: 2018-03-25

## 2018-03-30 ENCOUNTER — TELEPHONE (OUTPATIENT)
Dept: OBGYN | Facility: CLINIC | Age: 23
End: 2018-03-30

## 2018-03-30 ENCOUNTER — OFFICE VISIT (OUTPATIENT)
Dept: OBGYN | Facility: CLINIC | Age: 23
End: 2018-03-30
Attending: OBSTETRICS & GYNECOLOGY
Payer: COMMERCIAL

## 2018-03-30 VITALS
SYSTOLIC BLOOD PRESSURE: 127 MMHG | HEART RATE: 72 BPM | HEIGHT: 67 IN | BODY MASS INDEX: 22.29 KG/M2 | DIASTOLIC BLOOD PRESSURE: 68 MMHG | WEIGHT: 142 LBS

## 2018-03-30 DIAGNOSIS — E55.9 VITAMIN D DEFICIENCY: ICD-10-CM

## 2018-03-30 DIAGNOSIS — F41.9 ANXIETY: Primary | ICD-10-CM

## 2018-03-30 PROCEDURE — 82306 VITAMIN D 25 HYDROXY: CPT | Performed by: OBSTETRICS & GYNECOLOGY

## 2018-03-30 PROCEDURE — 36415 COLL VENOUS BLD VENIPUNCTURE: CPT | Performed by: OBSTETRICS & GYNECOLOGY

## 2018-03-30 RX ORDER — SERTRALINE HYDROCHLORIDE 25 MG/1
25 TABLET, FILM COATED ORAL DAILY
Qty: 30 TABLET | Refills: 3 | Status: SHIPPED | OUTPATIENT
Start: 2018-03-30 | End: 2018-06-28

## 2018-03-30 ASSESSMENT — ANXIETY QUESTIONNAIRES
1. FEELING NERVOUS, ANXIOUS, OR ON EDGE: NEARLY EVERY DAY
GAD7 TOTAL SCORE: 10
6. BECOMING EASILY ANNOYED OR IRRITABLE: SEVERAL DAYS
7. FEELING AFRAID AS IF SOMETHING AWFUL MIGHT HAPPEN: SEVERAL DAYS
5. BEING SO RESTLESS THAT IT IS HARD TO SIT STILL: SEVERAL DAYS
2. NOT BEING ABLE TO STOP OR CONTROL WORRYING: SEVERAL DAYS
3. WORRYING TOO MUCH ABOUT DIFFERENT THINGS: SEVERAL DAYS

## 2018-03-30 ASSESSMENT — PATIENT HEALTH QUESTIONNAIRE - PHQ9: 5. POOR APPETITE OR OVEREATING: MORE THAN HALF THE DAYS

## 2018-03-30 ASSESSMENT — PAIN SCALES - GENERAL: PAINLEVEL: NO PAIN (0)

## 2018-03-30 NOTE — TELEPHONE ENCOUNTER
Received call from patient's [facility] pharmacy stating insurance will not cover 25 mg Zoloft.  Will cover 50 mg dose.  Will send in Rx for 50 mg, take 1/2 tab for 1 week, then 1 pill daily thereafter. Dr. Kaminski

## 2018-03-30 NOTE — PROGRESS NOTES
[facility]  Annual Exam    HPI:    Nurys Gerardo is a 23 year old , here for well woman exam.  She wishes to discuss several things as outlined below:  - She mentions that she has a history of vitamin D deficiency and would like to get tested to see if she is adequately repletion her Vitamin D  - She mentions a history of depression/anxiety. She has been meeting with a therapist for the past two years and has found it helpful. Symptoms that she endorses, include: decreased appetite with stress, high energy, guilty feelings, and intermittent difficulty with sleep. She denies any problems with concentration, suicidal ideation or homicidal ideation. 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 She is interested in initiating a medication to her assist her with these symptoms.  - She mentions history of cramping with menstruation. She does not feel as though the intensity has significantly changed throughout her menstrual history, but states she has more coping mechanisms now then an in the past. She intermittently takes Ibuprofen with her cycle, which improves her pain. States her cramping is worse if she masturbates anytime within a week of her period.  - She mentions history of vaginismus. Denies history of sexual trauma. She is not sexually active nor has she ever been. She has attempted self penetration, but states she notes difficulty with insertion herself. Causes her a distinct sharp pain. She was unable to tolerate a speculum placement at her last visit either.  - Lastly, she wished to discuss contraception. She has tried OCPs in the past for two months, but she felt that they made her periods more painful and thus she discontinued them.  Interested in patch.    GYN History  - LMP: Patient's last menstrual period was 2018.  - Menses: Menarche:[age]. Cycles are irregularly regular (tracking on phone application, q28-30  "days), lasting 5-7 days, with mild to moderate flow, endorses moderate cramping  - Pap Smears: None completed  - Contraception: None, has used OCPs in the past for a two month interval  - Sexual Activity: Foreplay, but denies any oral or penetrative intercourse  - Hx STIs: Denies  - Hx UTIs: Denies    OBHx  Obstetric History       T0      L0     SAB0   TAB0   Ectopic0   Multiple0   Live Births0           PMHx:   Past Medical History:   Diagnosis Date     Anxiety     Sees therapist        PSHx:   Past Surgical History:   Procedure Laterality Date     DENTAL SURGERY      Molar removal       Meds:   Current Outpatient Prescriptions   Medication     sertraline (ZOLOFT) 25 MG tablet     cholecalciferol (VITAMIN D3) 1000 UNIT tablet     Calcium Carb-Cholecalciferol 600-1000 MG-UNIT CAPS     multivitamin, therapeutic with minerals (THERA-VIT-M) TABS     NAPROXEN PO     sertraline (ZOLOFT) 50 MG tablet     No current facility-administered medications for this visit.        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     FamHx: Denies family history of breast, colon, uterine, or ovarian cancer    ROS: 10-Point ROS negative except as noted in HPI    Preventative Health  Seat belt usage: Yes, daily basis  Diet: Meats, veggies, fruits   Exercise: Yes, regularly (run, play racquet ball)     Physical Exam  /67 mmHg  Pulse 75  Ht 1.702 m (5' 7\")  Wt 62.778 kg (138 lb 6.4 oz)  BMI 21.67 kg/m2  LMP 2017 (Exact Date)  Breastfeeding? No  Gen:                 Well-appearing, NAD  HEENT:  Normocephalic, atraumatic  Neck:               Thyroid is not enlarged, no appreciable masses palpated. Non-tender  CV:                  RRR, no m/r/g auscultated  Pulm:               CTAB, no w/r/r auscultated  Abd:                 Soft, " non-tender, non-distended  Ext:                  No LE edema, extremities warm and well perfused     Breast:            Symmetric, no nipple discharge or retraction, no axillary lymphadenopathy, no palpable nodules or masses bilaterally.     Pelvic:             Normal appearing external female genitalia. Normal hair distribution. Application of external lidocaine cream and lidocaine jelly to speculum.  Multiple attempts to place medium/small gastelum speculum with time allotted for adjustment. Patient unable to tolerate procedure during this visit, although she felt the speculum was able to be introduced further than last year. She felt that the lidocaine jelly did help.    --Ideal BMI: 18.5-24.9  Current BMI: Body mass index is 22.24 kg/(m^2).  --Underweight = <18.5  --Normal weight = 18.5-24.9  --Overweight = 25-29.9  --Obesity = >30    Assessment/Plan  Nurys Gerardo is a 23 year old  female here for annual exam.     1. Routine Health Maintenance:    - Vitamin D deficiency, lab level ordered, continue replacement as needed.    - Breast examination completed without abnormality noted.   - Regular menstrual cycles with cramping. Discussed pre-treatment and/or use of regularly scheduled NSAIDs to assist with cycle control. No further work-up at this time.   - Discussed contraception options. Hand outs provided on OCPs and Patch. Patient does not wish to start at the same time as any other new medications.    2. Cervical cancer screening:   - S/p Gardasil series (8/26/10, 10/29/10, 11)   - Unable to obtain pap smear d/t patient discomfort today. Attempted to obtain after application of lidocaine jelly/cream.    3. Depression/Anxiety   - Continue regular visits with therapist   - Initiation of SSRI treatment for symptoms.  Will begin with Sertraline 25 mcg daily x 1 week. She may then increased to 50 mcg daily. Discussed that the medication will not have an immediate affect (3 months time frame) and that  she should continue concurrent therapy. Will have patient return in three months to re-evaluate symptoms.  Reviewed general side effects, including GI upset and decreased libido.    4. Vaginismus   - Unable to tolerate penetration, including speculum examination   - Discussed possible relation to anxiety and that she may see some improvement with addition of SSRI for anxiety control.   - Also recommend continued attempt at self penetration/masturbation. Discussed potential of numbing gel or lubricants to assist in the process. Will continue to work with patient during her annual examinations as well.   - Also reviewed possibility for physical therapy, but she currently declines.    Follow Up: in three months to follow-up on addition of anti-anxiety medications  Staffed with: Dr. Nathen Elizondo MD  Ob/Gyn, PGY-3  3/30/2018 2:25 PM    The Patient was seen in [facility] by HANNAH ELIZONDO.  I reviewed the history & exam. Assessment and plan were jointly made.    Katarina Sena MD

## 2018-03-30 NOTE — MR AVS SNAPSHOT
"              After Visit Summary   3/30/2018    Nurys Gerardo    MRN: [id number]           Patient Information     Date Of Birth          1995        Visit Information        Provider Department      3/30/2018 2:30 PM Keisha Copeland MD [facility]        Today's Diagnoses     Anxiety    -  1    Vitamin D deficiency           Follow-ups after your visit        Follow-up notes from your care team     Return in about 3 months (around 6/30/2018).      Who to contact     Please call your clinic at [phone] to:    Ask questions about your health    Make or cancel appointments    Discuss your medicines    Learn about your test results    Speak to your doctor            Additional Information About Your Visit        [employer] Information     [employer] gives you secure access to your electronic health record. If you see a primary care provider, you can also send messages to your care team and make appointments. If you have questions, please call your primary care clinic.  If you do not have a primary care provider, please call [phone] and they will assist you.      [employer] is an electronic gateway that provides easy, online access to your medical records. With [employer], you can request a clinic appointment, read your test results, renew a prescription or communicate with your care team.     To access your existing account, please contact your [facility] or call [phone] for assistance.        Care EveryWhere ID     This is your Care EveryWhere ID. This could be used by other organizations to access your [facility] medical records  [id number]        Your Vitals Were     Pulse Height Last Period Breastfeeding? BMI (Body Mass Index)       72 1.702 m (5' 7\") 02/23/2018 No 22.24 kg/m2        Blood Pressure from Last 3 Encounters:   03/30/18 127/68   02/10/17 122/67   05/01/16 121/72    Weight from Last 3 Encounters:   03/30/18 64.4 kg (142 lb)   02/10/17 62.8 kg " (138 lb 6.4 oz)   05/01/16 63.5 kg (140 lb)              We Performed the Following     Vitamin D Deficiency          Today's Medication Changes          These changes are accurate as of 3/30/18 11:59 PM.  If you have any questions, ask your nurse or doctor.               Start taking these medicines.        Dose/Directions    * sertraline 25 MG tablet   Commonly known as:  ZOLOFT   Used for:  Anxiety   Started by:  Keisha Copeland MD        Dose:  25 mg   Take 1 tablet (25 mg) by mouth daily   Quantity:  30 tablet   Refills:  3       * sertraline 50 MG tablet   Commonly known as:  ZOLOFT   Used for:  Anxiety   Started by:  Elzbieta Kaminski MD        Dose:  50 mg   Take 1 tablet (50 mg) by mouth daily   Quantity:  30 tablet   Refills:  1       * Notice:  This list has 2 medication(s) that are the same as other medications prescribed for you. Read the directions carefully, and ask your doctor or other care provider to review them with you.      Stop taking these medicines if you haven't already. Please contact your care team if you have questions.     BENADRYL PO   Stopped by:  Keisha Copeland MD                Where to get your medicines      These medications were sent to [facility]/pharmacy #[id number] - [place], [place] - [address]  [address] [address]     Phone:  [phone]     sertraline 50 MG tablet         Some of these will need a paper prescription and others can be bought over the counter.  Ask your nurse if you have questions.     Bring a paper prescription for each of these medications     sertraline 25 MG tablet                Primary Care Provider Office Phone # Fax #    Jennifer Villalba [phone]       [facility] [address] [address]        Equal Access to Services     RUBIO FLORES : Owen Santiago, beatriz santiago, christian johnson. So [facility]  [phone].    ATENCIÓN: Si anil rabago, tiene a persaud disposición servicios gratuitos de asistencia lingüística. Audrey lopes [phone].    We comply with applicable federal civil rights laws and [place] laws. We do not discriminate on the basis of race, color, national origin, age, disability, sex, sexual orientation, or gender identity.            Thank you!     Thank you for choosing [facility]  for your care. Our goal is always to provide you with excellent care. Hearing back from our patients is one way we can continue to improve our services. Please take a few minutes to complete the written survey that you may receive in the mail after your visit with us. Thank you!             Your Updated Medication List - Protect others around you: Learn how to safely use, store and throw away your medicines at www.disposemymeds.org.          This list is accurate as of 3/30/18 11:59 PM.  Always use your most recent med list.                   Brand Name Dispense Instructions for use Diagnosis    Calcium Carb-Cholecalciferol 600-1000 MG-UNIT Caps           cholecalciferol 1000 UNIT tablet    vitamin D3     Take 1,000 Units by mouth        multivitamin, therapeutic with minerals Tabs tablet      Take 1 tablet by mouth daily        NAPROXEN PO      Take 500 mg by mouth as needed        * sertraline 25 MG tablet    ZOLOFT    30 tablet    Take 1 tablet (25 mg) by mouth daily    Anxiety       * sertraline 50 MG tablet    ZOLOFT    30 tablet    Take 1 tablet (50 mg) by mouth daily    Anxiety       * Notice:  This list has 2 medication(s) that are the same as other medications prescribed for you. Read the directions carefully, and ask your doctor or other care provider to review them with you.

## 2018-03-30 NOTE — LETTER
3/30/2018       RE: Nurys Gerardo  [address] [address]     Dear Colleague,    Thank you for referring your patient, Nurys Gerardo, to the [facility] at [facility]. Please see a copy of my visit note below.    [facility]  Annual Exam    HPI:    Nurys Gerardo is a 23 year old , here for well woman exam.  She wishes to discuss several things as outlined below:  - She mentions that she has a history of vitamin D deficiency and would like to get tested to see if she is adequately repletion her Vitamin D  - She mentions a history of depression/anxiety. She has been meeting with a therapist for the past two years and has found it helpful. Symptoms that she endorses, include: decreased appetite with stress, high energy, guilty feelings, and intermittent difficulty with sleep. She denies any problems with concentration, suicidal ideation or homicidal ideation. 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 She is interested in initiating a medication to her assist her with these symptoms.  - She mentions history of cramping with menstruation. She does not feel as though the intensity has significantly changed throughout her menstrual history, but states she has more coping mechanisms now then an in the past. She intermittently takes Ibuprofen with her cycle, which improves her pain. States her cramping is worse if she masturbates anytime within a week of her period.  - She mentions history of vaginismus. Denies history of sexual trauma. She is not sexually active nor has she ever been. She has attempted self penetration, but states she notes difficulty with insertion herself. Causes her a distinct sharp pain. She was unable to tolerate a speculum placement at her last visit either.  - Lastly, she wished to discuss contraception. She has tried OCPs in the past  "for two months, but she felt that they made her periods more painful and thus she discontinued them.  Interested in patch.    GYN History  - LMP: Patient's last menstrual period was 2018.  - Menses: Menarche:[age]. Cycles are irregularly regular (tracking on phone application, q28-30 days), lasting 5-7 days, with mild to moderate flow, endorses moderate cramping  - Pap Smears: None completed  - Contraception: None, has used OCPs in the past for a two month interval  - Sexual Activity: Foreplay, but denies any oral or penetrative intercourse  - Hx STIs: Denies  - Hx UTIs: Denies    OBHx  Obstetric History       T0      L0     SAB0   TAB0   Ectopic0   Multiple0   Live Births0           PMHx:   Past Medical History:   Diagnosis Date     Anxiety     Sees therapist        PSHx:   Past Surgical History:   Procedure Laterality Date     DENTAL SURGERY      Molar removal       Meds:   Current Outpatient Prescriptions   Medication     sertraline (ZOLOFT) 25 MG tablet     cholecalciferol (VITAMIN D3) 1000 UNIT tablet     Calcium Carb-Cholecalciferol 600-1000 MG-UNIT CAPS     multivitamin, therapeutic with minerals (THERA-VIT-M) TABS     NAPROXEN PO     sertraline (ZOLOFT) 50 MG tablet     No current facility-administered medications for this visit.        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     FamHx: Denies family history of breast, colon, uterine, or ovarian cancer    ROS: 10-Point ROS negative except as noted in HPI    Preventative Health  Seat belt usage: Yes, daily basis  Diet: Meats, veggies, fruits   Exercise: Yes, regularly (run, play racquet ball)     Physical Exam  /67 mmHg  Pulse 75  Ht 1.702 m (5' 7\")  Wt 62.778 kg (138 lb 6.4 oz)  BMI 21.67 kg/m2  LMP 2017 (Exact Date)  Breastfeeding? " No  Gen:                 Well-appearing, NAD  HEENT:  Normocephalic, atraumatic  Neck:               Thyroid is not enlarged, no appreciable masses palpated. Non-tender  CV:                  RRR, no m/r/g auscultated  Pulm:               CTAB, no w/r/r auscultated  Abd:                 Soft, non-tender, non-distended  Ext:                  No LE edema, extremities warm and well perfused     Breast:            Symmetric, no nipple discharge or retraction, no axillary lymphadenopathy, no palpable nodules or masses bilaterally.     Pelvic:             Normal appearing external female genitalia. Normal hair distribution. Application of external lidocaine cream and lidocaine jelly to speculum.  Multiple attempts to place medium/small gastelum speculum with time allotted for adjustment. Patient unable to tolerate procedure during this visit, although she felt the speculum was able to be introduced further than last year. She felt that the lidocaine jelly did help.    --Ideal BMI: 18.5-24.9  Current BMI: Body mass index is 22.24 kg/(m^2).  --Underweight = <18.5  --Normal weight = 18.5-24.9  --Overweight = 25-29.9  --Obesity = >30    Assessment/Plan  Nurys Gerardo is a 23 year old  female here for annual exam.     1. Routine Health Maintenance:    - Vitamin D deficiency, lab level ordered, continue replacement as needed.    - Breast examination completed without abnormality noted.   - Regular menstrual cycles with cramping. Discussed pre-treatment and/or use of regularly scheduled NSAIDs to assist with cycle control. No further work-up at this time.   - Discussed contraception options. Hand outs provided on OCPs and Patch. Patient does not wish to start at the same time as any other new medications.    2. Cervical cancer screening:   - S/p Gardasil series (8/26/10, 10/29/10, 11)   - Unable to obtain pap smear d/t patient discomfort today. Attempted to obtain after application of lidocaine jelly/cream.    3.  Depression/Anxiety   - Continue regular visits with therapist   - Initiation of SSRI treatment for symptoms.  Will begin with Sertraline 25 mcg daily x 1 week. She may then increased to 50 mcg daily. Discussed that the medication will not have an immediate affect (3 months time frame) and that she should continue concurrent therapy. Will have patient return in three months to re-evaluate symptoms.  Reviewed general side effects, including GI upset and decreased libido.    4. Vaginismus   - Unable to tolerate penetration, including speculum examination   - Discussed possible relation to anxiety and that she may see some improvement with addition of SSRI for anxiety control.   - Also recommend continued attempt at self penetration/masturbation. Discussed potential of numbing gel or lubricants to assist in the process. Will continue to work with patient during her annual examinations as well.   - Also reviewed possibility for physical therapy, but she currently declines.    Follow Up: in three months to follow-up on addition of anti-anxiety medications  Staffed with: Dr. Nathen Elizondo MD  Ob/Gyn, PGY-3  3/30/2018 2:25 PM    The Patient was seen in [facility] by HANNAH ELIZONDO.  I reviewed the history & exam. Assessment and plan were jointly made.    Katarina Sena MD

## 2018-03-31 ASSESSMENT — ANXIETY QUESTIONNAIRES: GAD7 TOTAL SCORE: 10

## 2018-03-31 ASSESSMENT — PATIENT HEALTH QUESTIONNAIRE - PHQ9: SUM OF ALL RESPONSES TO PHQ QUESTIONS 1-9: 3

## 2018-04-02 LAB — DEPRECATED CALCIDIOL+CALCIFEROL SERPL-MC: 27 UG/L (ref 20–75)

## 2018-05-08 ENCOUNTER — OFFICE VISIT (OUTPATIENT)
Dept: OPHTHALMOLOGY | Facility: CLINIC | Age: 23
End: 2018-05-08
Payer: COMMERCIAL

## 2018-05-08 DIAGNOSIS — H43.393 VITREOUS SYNERESIS OF BOTH EYES: Primary | ICD-10-CM

## 2018-05-08 DIAGNOSIS — H52.03 HYPERMETROPIA OF BOTH EYES: ICD-10-CM

## 2018-05-08 ASSESSMENT — EXTERNAL EXAM - LEFT EYE: OS_EXAM: NORMAL

## 2018-05-08 ASSESSMENT — CONF VISUAL FIELD
OD_NORMAL: 1
OS_NORMAL: 1
METHOD: COUNTING FINGERS

## 2018-05-08 ASSESSMENT — REFRACTION
OD_SPHERE: +0.25
OS_CYLINDER: +0.50
OS_AXIS: 095
OD_AXIS: 090
OD_CYLINDER: +0.50
OS_SPHERE: +0.50

## 2018-05-08 ASSESSMENT — REFRACTION_MANIFEST
OD_CYLINDER: SPHERE
OS_SPHERE: PLANO
OD_SPHERE: -0.25
OS_CYLINDER: SPHERE

## 2018-05-08 ASSESSMENT — TONOMETRY
OD_IOP_MMHG: 15
OS_IOP_MMHG: 15
IOP_METHOD: ICARE

## 2018-05-08 ASSESSMENT — CUP TO DISC RATIO
OS_RATIO: 0.15
OD_RATIO: 0.15

## 2018-05-08 ASSESSMENT — SLIT LAMP EXAM - LIDS
COMMENTS: NORMAL
COMMENTS: NORMAL

## 2018-05-08 ASSESSMENT — EXTERNAL EXAM - RIGHT EYE: OD_EXAM: NORMAL

## 2018-05-08 ASSESSMENT — VISUAL ACUITY
OD_SC: 20/20
OS_SC: J1+
OS_SC: 20/20
OD_SC: J1+
METHOD: SNELLEN - LINEAR

## 2018-05-08 NOTE — NURSING NOTE
Chief Complaints and History of Present Illnesses   Patient presents with     Annual Eye Exam     HPI    Affected eye(s):  Both   Symptoms:     No blurred vision   Floaters   No flashes      Frequency:  Constant       Do you have eye pain now?:  No      Comments:  Noticed floaters in the left eye in the last month. No flashes of light. No vision changes. Patient denies eye pain or irritation. Does not use any eye drops.    Darlene AGUIRRE 12:02 PM May 8, 2018

## 2018-05-08 NOTE — PROGRESS NOTES
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 If symptoms present, return to clinic immediately.    (H52.03) Hypermetropia of both eyes  Comment: Latent hyperopia OU  Plan: REFRACTION [14515]         No spectacle prescription recommended. Monitor.    Return to clinic in 2 year for comprehensive eye exam.    Complete documentation of historical and exam elements from today's encounter can  be found in the full encounter summary report (not reduplicated in this progress  note). I personally obtained the chief complaint(s) and history of present illness. I  confirmed and edited as necessary the review of systems, past medical/surgical  history, family history, social history, and examination findings as documented by  others; and I examined the patient myself. I personally reviewed the relevant tests,  images, and reports as documented above. I formulated and edited as necessary the  assessment and plan and discussed the findings and management plan with the  patient and family.    Willie Merrill, OD, FAAO

## 2018-05-08 NOTE — MR AVS SNAPSHOT
After Visit Summary   5/8/2018    Nurys Gerardo    MRN: [id number]           Patient Information     Date Of Birth          1995        Visit Information        Provider Department      5/8/2018 12:20 PM Willie Merrill, MARIELENA M [facility] Ophthalmology        Today's Diagnoses     Vitreous syneresis of both eyes    -  1    Hypermetropia of both eyes           Follow-ups after your visit        Follow-up notes from your care team     Return in about 1 year (around 5/8/2019) for Comprehensive Eye Exam.      Who to contact     Please call your clinic at [phone] to:    Ask questions about your health    Make or cancel appointments    Discuss your medicines    Learn about your test results    Speak to your doctor            Additional Information About Your Visit        [employer] Information     [employer] gives you secure access to your electronic health record. If you see a primary care provider, you can also send messages to your care team and make appointments. If you have questions, please call your primary care clinic.  If you do not have a primary care provider, please call [phone] and they will assist you.      [employer] is an electronic gateway that provides easy, online access to your medical records. With [employer], you can request a clinic appointment, read your test results, renew a prescription or communicate with your care team.     To access your existing account, please contact your [facility] or call [phone] for assistance.        Care EveryWhere ID     This is your Care EveryWhere ID. This could be used by other organizations to access your [place] medical records  [id number]         Blood Pressure from Last 3 Encounters:   03/30/18 127/68   02/10/17 122/67   05/01/16 121/72    Weight from Last 3 Encounters:   03/30/18 64.4 kg (142 lb)   02/10/17 62.8 kg (138 lb 6.4 oz)   05/01/16 63.5 kg (140 lb)              We Performed the Following      REFRACTION [[id number]]        Primary Care Provider Office Phone # Fax #    Jennifer Villalba [phone]       [facility] [address]  [facility] [address]        Equal Access to Services     RUBIO FLORES : Owen eunice ku collino Soomaali, waaxda luqadaha, qaybta kaalmada adeegyada, waxjohana idiin katharinen dagmar jacome laKristajanelle galan. So [facility] [phone].    ATENCIÓN: Si habla español, tiene a persaud disposición servicios gratuitos de asistencia lingüística. [facility] [phone].    We comply with applicable federal civil rights laws and [place] laws. We do not discriminate on the basis of race, color, national origin, age, disability, sex, sexual orientation, or gender identity.            Thank you!     Thank you for choosing [facility] OPHTHALMOLOGY  for your care. Our goal is always to provide you with excellent care. Hearing back from our patients is one way we can continue to improve our services. Please take a few minutes to complete the written survey that you may receive in the mail after your visit with us. Thank you!             Your Updated Medication List - Protect others around you: Learn how to safely use, store and throw away your medicines at www.disposemymeds.org.          This list is accurate as of 5/8/18 12:38 PM.  Always use your most recent med list.                   Brand Name Dispense Instructions for use Diagnosis    Calcium Carb-Cholecalciferol 600-1000 MG-UNIT Caps           cholecalciferol 1000 UNIT tablet    vitamin D3     Take 1,000 Units by mouth        multivitamin, therapeutic with minerals Tabs tablet      Take 1 tablet by mouth daily        NAPROXEN PO      Take 500 mg by mouth as needed        * sertraline 25 MG tablet    ZOLOFT    30 tablet    Take 1 tablet (25 mg) by mouth daily    Anxiety       * sertraline 50 MG tablet    ZOLOFT    30 tablet    Take 1 tablet (50 mg) by mouth daily    Anxiety       * Notice:  This list has 2 medication(s) that are the same as  other medications prescribed for you. Read the directions carefully, and ask your doctor or other care provider to review them with you.

## 2018-06-01 ENCOUNTER — TELEPHONE (OUTPATIENT)
Dept: OBGYN | Facility: CLINIC | Age: 23
End: 2018-06-01

## 2018-06-01 DIAGNOSIS — F41.9 ANXIETY: ICD-10-CM

## 2018-06-01 NOTE — TELEPHONE ENCOUNTER
----- Message from Malikangelia Adriana sent at 6/1/2018  9:47 AM CDT -----  Regarding: [place]  Pt called to have sertraline (ZOLOFT) 50 MG tablet [[id number]] (Order [id number]). She only 2 pills left and she is out if state right now.   Please have the order to be sent to   [id number]  Address: [facility] [address] [facility] [address], [place] [address]   Phone number is [phone]  Couldn't find a fax number.    Thank you  Malikangelia Hitesh Wright  Call Center   Please DO NOT send this message and/or reply back to sender. Call Center Representatives DO NOT respond to messages

## 2018-06-01 NOTE — TELEPHONE ENCOUNTER
Returned pt's call regarding refill of Sertraline.  Explained 1 month was sent.  Encouraged her to My Chart or call prescribing physician to give update and then physician would renew RX or change RX.  Pt is out of town fo rthe whole summer so not able to come to clinic.    MD Vlad

## 2018-06-01 NOTE — TELEPHONE ENCOUNTER
Received refill request for patients sertraline. Last seen in clinic 3/2018 and plan was to follow up in June to see how medication is going. 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 Will also send mychart to pt.

## 2018-06-26 ENCOUNTER — MYC MEDICAL ADVICE (OUTPATIENT)
Dept: OBGYN | Facility: CLINIC | Age: 23
End: 2018-06-26

## 2018-06-26 DIAGNOSIS — F41.9 ANXIETY: ICD-10-CM

## 2018-06-27 NOTE — TELEPHONE ENCOUNTER
Left message on Nurys's phone to inform I will send her message to Dr. Copeland who will be in on Friday, but if she is out of medication to call back and we will send short term supply. Also sending patient mychart message.

## 2018-12-13 ENCOUNTER — OFFICE VISIT (OUTPATIENT)
Dept: OBGYN | Facility: CLINIC | Age: 23
End: 2018-12-13
Attending: OBSTETRICS & GYNECOLOGY
Payer: COMMERCIAL

## 2018-12-13 VITALS
BODY MASS INDEX: 23.64 KG/M2 | HEIGHT: 67 IN | DIASTOLIC BLOOD PRESSURE: 73 MMHG | WEIGHT: 150.6 LBS | HEART RATE: 86 BPM | SYSTOLIC BLOOD PRESSURE: 119 MMHG

## 2018-12-13 DIAGNOSIS — Z30.011 ENCOUNTER FOR INITIAL PRESCRIPTION OF CONTRACEPTIVE PILLS: Primary | ICD-10-CM

## 2018-12-13 PROCEDURE — G0463 HOSPITAL OUTPT CLINIC VISIT: HCPCS | Mod: ZF

## 2018-12-13 RX ORDER — NORGESTIMATE AND ETHINYL ESTRADIOL 0.25-0.035
1 KIT ORAL DAILY
Qty: 84 TABLET | Refills: 4 | Status: SHIPPED | OUTPATIENT
Start: 2018-12-13 | End: 2019-12-13

## 2018-12-13 ASSESSMENT — MIFFLIN-ST. JEOR: SCORE: 1475.75

## 2018-12-13 ASSESSMENT — PAIN SCALES - GENERAL: PAINLEVEL: NO PAIN (0)

## 2018-12-13 NOTE — LETTER
"12/13/2018       RE: Nurys Gerardo  [address] [address]     Dear Colleague,    Thank you for referring your patient, Nurys Gerardo, to the [facility] at [facility]. Please see a copy of my visit note below.    S Clinic Note    S: 24yo G0 who desires to discuss contraception options.     Reports no health updates since annual in March. She has done a lot of reading and is debating between the Nexplanon and OCPs. She likes the \"get it and forget it\" nature of the Nexplanon but is a little nervous about the commitment. She feels she is pretty good about remembering to take a pill daily as takes vitamins daily. She is not currently sexually active but may be and wants to have a good contraception plan.    Past Medical History:   Diagnosis Date     Anxiety     Sees therapist       Past Surgical History:   Procedure Laterality Date     DENTAL SURGERY      Molar removal      Current Outpatient Medications   Medication     Calcium Carb-Cholecalciferol 600-1000 MG-UNIT CAPS     cholecalciferol (VITAMIN D3) 1000 UNIT tablet     multivitamin, therapeutic with minerals (THERA-VIT-M) TABS     NAPROXEN PO     sertraline (ZOLOFT) 50 MG tablet     No current facility-administered medications for this visit.      Allergies   Allergen Reactions     Amoxicillin Hives     Zithromax [Azithromycin] Hives     O: /73 (BP Location: Left arm, Patient Position: Chair)   Pulse 86   Ht 1.702 m (5' 7\")   Wt 68.3 kg (150 lb 9.6 oz)   LMP 12/06/2018 (Exact Date)   Breastfeeding? No   BMI 23.59 kg/m     Gen: NAD  Cardiac: RRR  Pulm: nonlabored breathing  Abd: soft, nontender    A/P: Ms Gerardo is a 24yo who desires contraception. After discussion of Nexplanon as most efficacious with possible side effects of irregular bleeding and insertion process, the patient decided she would like to try a couple weeks of OCPs first and will call if she decides she wants to " try nexplanon.  - Sprintec sent to pharmacy on file    Hannah Menchaca MD  12/13/18    The Patient was seen in [facility] by HANNAH MENCHACA.  I reviewed the history & exam. Assessment and plan were jointly made.    Katarina Sena MD

## 2018-12-13 NOTE — NURSING NOTE
Chief Complaint   Patient presents with     Contraception     Contraception consult--open to any type but interested in Nexplanon.       See TRACY Hunt 12/13/2018

## 2018-12-13 NOTE — PROGRESS NOTES
"[facility] Clinic Note    S: 22yo G0 who desires to discuss contraception options.     Reports no health updates since annual in March. She has done a lot of reading and is debating between the Nexplanon and OCPs. She likes the \"get it and forget it\" nature of the Nexplanon but is a little nervous about the commitment. She feels she is pretty good about remembering to take a pill daily as takes vitamins daily. She is not currently sexually active but may be and wants to have a good contraception plan.    Past Medical History:   Diagnosis Date     Anxiety     Sees therapist       Past Surgical History:   Procedure Laterality Date     DENTAL SURGERY      Molar removal      Current Outpatient Medications   Medication     Calcium Carb-Cholecalciferol 600-1000 MG-UNIT CAPS     cholecalciferol (VITAMIN D3) 1000 UNIT tablet     multivitamin, therapeutic with minerals (THERA-VIT-M) TABS     NAPROXEN PO     sertraline (ZOLOFT) 50 MG tablet     No current facility-administered medications for this visit.      Allergies   Allergen Reactions     Amoxicillin Hives     Zithromax [Azithromycin] Hives     O: /73 (BP Location: Left arm, Patient Position: Chair)   Pulse 86   Ht 1.702 m (5' 7\")   Wt 68.3 kg (150 lb 9.6 oz)   LMP 12/06/2018 (Exact Date)   Breastfeeding? No   BMI 23.59 kg/m    Gen: NAD  Cardiac: RRR  Pulm: nonlabored breathing  Abd: soft, nontender    A/P: Ms Gerardo is a 22yo who desires contraception. After discussion of Nexplanon as most efficacious with possible side effects of irregular bleeding and insertion process, the patient decided she would like to try a couple weeks of OCPs first and will call if she decides she wants to try nexplanon.  - Sprintec sent to pharmacy on file    Hannah Panchal MD  12/13/18    The Patient was seen in [facility] by HANNAH PANCHAL.  I reviewed the history & exam. Assessment and plan were jointly made.    Katarina Sena MD      "

## 2018-12-13 NOTE — PATIENT INSTRUCTIONS
You were seen for discussion of contraception. We discussed oral contraceptive pills and nexplanon implant and will try to pills first.

## 2019-03-03 ENCOUNTER — MYC REFILL (OUTPATIENT)
Dept: OBGYN | Facility: CLINIC | Age: 24
End: 2019-03-03

## 2019-03-03 DIAGNOSIS — F41.9 ANXIETY: ICD-10-CM

## 2019-03-03 DIAGNOSIS — Z30.011 ENCOUNTER FOR INITIAL PRESCRIPTION OF CONTRACEPTIVE PILLS: ICD-10-CM

## 2019-03-03 RX ORDER — NORGESTIMATE AND ETHINYL ESTRADIOL 0.25-0.035
1 KIT ORAL DAILY
Qty: 84 TABLET | Refills: 4 | Status: CANCELLED | OUTPATIENT
Start: 2019-03-03

## 2019-03-07 NOTE — TELEPHONE ENCOUNTER
Received refill request for OCP and zoloft. Patient should have plenty of ocp refills available at the pharmacy. Refilled zoloft per Nathen.

## 2019-04-29 ENCOUNTER — OFFICE VISIT (OUTPATIENT)
Dept: OBGYN | Facility: CLINIC | Age: 24
End: 2019-04-29
Attending: OBSTETRICS & GYNECOLOGY
Payer: COMMERCIAL

## 2019-04-29 VITALS
HEIGHT: 67 IN | SYSTOLIC BLOOD PRESSURE: 113 MMHG | BODY MASS INDEX: 24.58 KG/M2 | DIASTOLIC BLOOD PRESSURE: 72 MMHG | HEART RATE: 66 BPM | WEIGHT: 156.6 LBS

## 2019-04-29 DIAGNOSIS — Z30.46 ENCOUNTER FOR SURVEILLANCE OF IMPLANTABLE SUBDERMAL CONTRACEPTIVE: Primary | ICD-10-CM

## 2019-04-29 PROCEDURE — 11981 INSERTION DRUG DLVR IMPLANT: CPT | Mod: ZF | Performed by: OBSTETRICS & GYNECOLOGY

## 2019-04-29 PROCEDURE — G0463 HOSPITAL OUTPT CLINIC VISIT: HCPCS | Mod: ZF

## 2019-04-29 PROCEDURE — 25000128 H RX IP 250 OP 636: Mod: ZF | Performed by: OBSTETRICS & GYNECOLOGY

## 2019-04-29 RX ADMIN — ETONOGESTREL 68 MG: 68 IMPLANT SUBCUTANEOUS at 16:19

## 2019-04-29 ASSESSMENT — MIFFLIN-ST. JEOR: SCORE: 1497.96

## 2019-04-29 NOTE — LETTER
RE: Nurys Gerardo  [address] [address]     Dear Colleague,    Thank you for referring your patient, Nurys Gerardo, to the [facility] at [facility]. Please see a copy of my visit note below.    Procedure note:    Patient presents for placement of Nexplanon for contraception.  She has had been counseled on side effects, risks, benefits and alternatives.  Patient desires to proceed.    Verification of Procedure:  Just before the procedure begans through verbal and active participation of team members, I verified:     Initials   Patient Name SMD   Patient  SMD   Procedure to be performed SMD     Consent:  Risks, benefits of treatment, and alternative options for contraception were discussed.  Patient's questions were elicited and answered.  Written consent was obtained and scanned into medical record.     Patient was resting comfortably on exam table, left arm placed at shoulder level in a 90 degree angle.  Skin was marked 8cm from epicondyl and cleansed with betadine solution.  Insertion site and track infiltrated with 2cc 1% Lidocaine.      Nexplanon device visualized in applicator by patient and provider.  Skin punctured with applicator at insertion site and advanced with ease in the intradermal space.  Applicator was removed.  Nexplanon was palpated by provider and patient.    Small amount of bleeding noted at insertion site and no bruising noted along track of Nexplanon.  Bandage and pressure dressing applied to insertion site.       Patient tolerated procedure well.  Lot number [id number].  To be removed/replaced by 2022.    Written and verbal instructions provided to patient.      Cheryl Mora, MS3   OBGYN  Pager: [phone]    I was present with the medical student who participated in the service and in the documentation of the note. I have verified the history and personally performed the physical exam and medical  "decision making. I agree with the assessment and plan of care as documented in the note.  I performed the Nexplanon insertion.     Josy Li MD          Gynecology Consult Note    Referring Physician: N/A - self-referred  Reason for Consult: migraines on OCP    HPI: Nurys Gerardo is a 24 year old  who presents to clinic to discuss migraines with aura in setting of new OCP. Patient began taking Sprintec in 2018 and has noted 4 migraines with aura in 2019. She previously \"never really\" had migraines. Her symptoms consist of bad headache, photophobia, nausea with occasional vomiting, \"staticky\" obstruction to her visual field, and occasional partial hand or mouth tingling. Bright phone light in isolation (without background lighting) makes it worse, ibuprofen and laying low make it better, and she does not notice any association with her period timing. 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     For contraception options, she has tried Mononessa but disliked the bad periods. She had considered the implant before trying Sprintec and is still considering it now. She is open to other ideas but dislikes the idea of having an IUD placed. Not currently sexually active but wants to \"be prepared\" if right person comes along. Her period has been fairly normal of late, except she notices she doesn't always get her period while on placebo. Period lasts 7 days without overly heavy bleeding.     OBHx:     GynHx: LMP 2019  Menses- fairly regular on OCP, recently started placebo pills and having accompanying period  Sexual activity- not currently, possibly in future if meets partner  Last pap- unknown  Contraception Hx: Mononessa prior to Sprintec     PMH:   Past Medical History:   Diagnosis Date     Anxiety     Sees therapist        PSH:   Past Surgical History:   Procedure Laterality " "Date     DENTAL SURGERY      Molar removal       Social Hx:   Social History     Tobacco Use     Smoking status: Never Smoker     Smokeless tobacco: Never Used   Substance Use Topics     Alcohol use: Yes     Alcohol/week: 0.0 oz     Comment: very occas     Drug use: No        Family History: family history includes Asthma in her paternal grandmother; Bladder Cancer in her maternal grandfather; Hypertension in her father.  No family history of breast, ovarian or uterine cancer. No history of DVT or migranes.    Objective:   /72 (BP Location: Left arm, Patient Position: Chair)   Pulse 66   Ht 1.702 m (5' 7\")   Wt 71 kg (156 lb 9.6 oz)   LMP 2019 (Exact Date)   Breastfeeding? No   BMI 24.53 kg/m     Constitutional: Healthy appearing female, no acute distress  HEENT: Normal appearance.    Cardiovascular: Regular rate and rhythm without murmurs, clicks, gallops or rub, normal S1 and S2  Respiratory: Clear to auscultation bilaterally without crackles or wheezes, breathing comfortably on room air  Psychiatric: mentation appears normal and affect mildly anxious    Labs/Imaging:  Results for orders placed or performed in visit on 18   Vitamin D Deficiency   Result Value Ref Range    Vitamin D Deficiency screening 27 20 - 75 ug/L      Assessment/Plan:   Nurys Gerardo is a 24 year old  who presents to clinic to discuss migraines with aura in setting of new OCP. Although patient normotensive today, cannot rule out association between OCP and migraines with aura, and would recommend discontinuing estrogen-containing medication due to stroke and clot risk.     Patient is a good candidate for implant and wishes to proceed with placement today, for removal within 3 years.    Discussed with Dr. Li.     See separate note for procedure details.     Cheryl Mora, MS3   OBGYN  Pager: [phone]    I was present with the medical student who participated in the service and in the documentation of " the note. I have verified the history and personally performed the physical exam and medical decision making. I agree with the assessment and plan of care as documented in the note.    Again, thank you for allowing me to participate in the care of your patient.      Sincerely,    Josy Li MD

## 2019-04-29 NOTE — PROGRESS NOTES
"Gynecology Consult Note    Referring Physician: N/A - self-referred  Reason for Consult: migraines on OCP    HPI: Nurys Gerardo is a 24 year old  who presents to clinic to discuss migraines with aura in setting of new OCP. Patient began taking Sprintec in 2018 and has noted 4 migraines with aura in 2019. She previously \"never really\" had migraines. Her symptoms consist of bad headache, photophobia, nausea with occasional vomiting, \"staticky\" obstruction to her visual field, and occasional partial hand or mouth tingling. Bright phone light in isolation (without background lighting) makes it worse, ibuprofen and laying low make it better, and she does not notice any association with her period timing. 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     For contraception options, she has tried Mononessa but disliked the bad periods. She had considered the implant before trying Sprintec and is still considering it now. She is open to other ideas but dislikes the idea of having an IUD placed. Not currently sexually active but wants to \"be prepared\" if right person comes along. Her period has been fairly normal of late, except she notices she doesn't always get her period while on placebo. Period lasts 7 days without overly heavy bleeding.     OBHx:     GynHx: LMP 2019  Menses- fairly regular on OCP, recently started placebo pills and having accompanying period  Sexual activity- not currently, possibly in future if meets partner  Last pap- unknown  Contraception Hx: Mononessa prior to Sprintec     PMH:   Past Medical History:   Diagnosis Date     Anxiety     Sees therapist        PSH:   Past Surgical History:   Procedure Laterality Date     DENTAL SURGERY      Molar removal       Social Hx:   Social History     Tobacco Use     Smoking status: Never Smoker     Smokeless tobacco: Never Used " "  Substance Use Topics     Alcohol use: Yes     Alcohol/week: 0.0 oz     Comment: very occas     Drug use: No        Family History: family history includes Asthma in her paternal grandmother; Bladder Cancer in her maternal grandfather; Hypertension in her father.  No family history of breast, ovarian or uterine cancer. No history of DVT or migranes.    ROS:   Negative except per HPI    Objective:   /72 (BP Location: Left arm, Patient Position: Chair)   Pulse 66   Ht 1.702 m (5' 7\")   Wt 71 kg (156 lb 9.6 oz)   LMP 2019 (Exact Date)   Breastfeeding? No   BMI 24.53 kg/m    Constitutional: Healthy appearing female, no acute distress  HEENT: Normal appearance.    Cardiovascular: Regular rate and rhythm without murmurs, clicks, gallops or rub, normal S1 and S2  Respiratory: Clear to auscultation bilaterally without crackles or wheezes, breathing comfortably on room air  Psychiatric: mentation appears normal and affect mildly anxious    Labs/Imaging:  Results for orders placed or performed in visit on 18   Vitamin D Deficiency   Result Value Ref Range    Vitamin D Deficiency screening 27 20 - 75 ug/L        Assessment/Plan:   Nurys Gerardo is a 24 year old  who presents to clinic to discuss migraines with aura in setting of new OCP. Although patient normotensive today, cannot rule out association between OCP and migraines with aura, and would recommend discontinuing estrogen-containing medication due to stroke and clot risk.     Patient is a good candidate for implant and wishes to proceed with placement today, for removal within 3 years.    Discussed with Dr. Li.     See separate note for procedure details.     Cheryl Mora, MS3   OBGYN  Pager: [phone]    I was present with the medical student who participated in the service and in the documentation of the note. I have verified the history and personally performed the physical exam and medical decision making. I agree with " the assessment and plan of care as documented in the note.    Josy Li MD

## 2019-04-29 NOTE — PROGRESS NOTES
Procedure note:    Patient presents for placement of Nexplanon for contraception.  She has had been counseled on side effects, risks, benefits and alternatives.  Patient desires to proceed.    Verification of Procedure:  Just before the procedure begans through verbal and active participation of team members, I verified:     Initials   Patient Name SMD   Patient  SMD   Procedure to be performed SMD     Consent:  Risks, benefits of treatment, and alternative options for contraception were discussed.  Patient's questions were elicited and answered.  Written consent was obtained and scanned into medical record.     Patient was resting comfortably on exam table, left arm placed at shoulder level in a 90 degree angle.  Skin was marked 8cm from epicondyl and cleansed with betadine solution.  Insertion site and track infiltrated with 2cc 1% Lidocaine.      Nexplanon device visualized in applicator by patient and provider.  Skin punctured with applicator at insertion site and advanced with ease in the intradermal space.  Applicator was removed.  Nexplanon was palpated by provider and patient.    Small amount of bleeding noted at insertion site and no bruising noted along track of Nexplanon.  Bandage and pressure dressing applied to insertion site.       Patient tolerated procedure well.  Lot number [id number].  To be removed/replaced by 2022.    Written and verbal instructions provided to patient.      Cheryl Mora, MS3   OBGYN  Pager: [phone]    I was present with the medical student who participated in the service and in the documentation of the note. I have verified the history and personally performed the physical exam and medical decision making. I agree with the assessment and plan of care as documented in the note.  I performed the Nexplanon insertion.     Josy Li MD

## 2019-04-29 NOTE — NURSING NOTE
Chief Complaint   Patient presents with     Contraception     Discuss contraception. Been having migraines.        See TRACY Hunt 4/29/2019

## 2020-02-25 DIAGNOSIS — F41.9 ANXIETY: ICD-10-CM

## 2020-02-25 NOTE — TELEPHONE ENCOUNTER
Received refill request for Sertraline.  Last in clinic 4/2019 for nexplanon insertion.      Originally prescribed 03/2018 by Dr. Kaminski, and appears no further of evaluation since initially prescribed.    Tried to reach [facility] but received voicemail.  Left message that refill request was received and a one month supply can be sent to pharmacy but office visit is due for further refills. Please call [phone] to schedule.

## 2020-03-10 ENCOUNTER — HEALTH MAINTENANCE LETTER (OUTPATIENT)
Age: 25
End: 2020-03-10

## 2020-04-07 ENCOUNTER — TELEPHONE (OUTPATIENT)
Dept: OBGYN | Facility: CLINIC | Age: 25
End: 2020-04-07

## 2020-04-07 DIAGNOSIS — F41.9 ANXIETY: ICD-10-CM

## 2020-04-07 NOTE — TELEPHONE ENCOUNTER
----- Message from Tiffanie Nguyen sent at 4/6/2020 12:41 PM CDT -----  Regarding: returning message  Contact: [phone]  Pt called stating she received the voicemail in regards to medication and needing an annual appointment. Pt stated she moved out of state and will not be making an annual. Pt would like a call back to discuss getting her medication sent to where she is currently staying. Please Review and follow up with pt    Thanks  Tiffanie

## 2020-04-07 NOTE — TELEPHONE ENCOUNTER
Discussed with Nurys that we can send a short-term supply as she works to establish care, understanding that establishing care during COVID-19 is challenging.    Refill sent to new pharmacy. Patient understands that we will not be able to provide further refills.

## 2020-04-30 DIAGNOSIS — F41.9 ANXIETY: ICD-10-CM

## 2020-04-30 NOTE — TELEPHONE ENCOUNTER
Insurance asking for 90 day refill supply. Patient moved [place]. Refilling due to COVID but patient will need to establish care in new town.

## 2020-07-31 NOTE — TELEPHONE ENCOUNTER
Received refill request for sertraline.  Last in clinic 04/2019.  Pt has moved and has established care outside of the state.  Pt states she has enough sertraline left    [facility] notified that pt has changed providers by message left to please contact pt to ask  Where to send to current provider in area.  Please do not send rx requests as pt has changed providers.

## 2020-12-20 ENCOUNTER — HEALTH MAINTENANCE LETTER (OUTPATIENT)
Age: 25
End: 2020-12-20

## 2021-04-24 ENCOUNTER — HEALTH MAINTENANCE LETTER (OUTPATIENT)
Age: 26
End: 2021-04-24

## 2021-05-29 ENCOUNTER — RECORDS - HEALTHEAST (OUTPATIENT)
Dept: ADMINISTRATIVE | Facility: CLINIC | Age: 26
End: 2021-05-29

## 2021-05-30 ENCOUNTER — RECORDS - HEALTHEAST (OUTPATIENT)
Dept: ADMINISTRATIVE | Facility: CLINIC | Age: 26
End: 2021-05-30

## 2021-10-03 ENCOUNTER — HEALTH MAINTENANCE LETTER (OUTPATIENT)
Age: 26
End: 2021-10-03

## 2022-05-15 ENCOUNTER — HEALTH MAINTENANCE LETTER (OUTPATIENT)
Age: 27
End: 2022-05-15

## 2022-09-11 ENCOUNTER — HEALTH MAINTENANCE LETTER (OUTPATIENT)
Age: 27
End: 2022-09-11

## 2023-06-03 ENCOUNTER — HEALTH MAINTENANCE LETTER (OUTPATIENT)
Age: 28
End: 2023-06-03